# Patient Record
Sex: MALE | Race: WHITE | NOT HISPANIC OR LATINO | Employment: UNEMPLOYED | URBAN - METROPOLITAN AREA
[De-identification: names, ages, dates, MRNs, and addresses within clinical notes are randomized per-mention and may not be internally consistent; named-entity substitution may affect disease eponyms.]

---

## 2024-04-24 DIAGNOSIS — Z52.001 STEM CELL DONOR: Primary | ICD-10-CM

## 2024-04-30 ENCOUNTER — LAB (OUTPATIENT)
Dept: LAB | Facility: CLINIC | Age: 54
End: 2024-04-30

## 2024-04-30 DIAGNOSIS — Z52.001 STEM CELL DONOR: ICD-10-CM

## 2024-04-30 PROCEDURE — 81378 HLA I & II TYPING HR: CPT

## 2024-05-06 LAB
A*: NORMAL
A*LOCUS SEROLOGIC EQUIVALENT: 2
A*LOCUS: NORMAL
A*SEROLOGIC EQUIVALENT: 24
ABTEST METHOD: NORMAL
B*: NORMAL
B*LOCUS SEROLOGIC EQUIVALENT: 60
B*LOCUS: NORMAL
B*SEROLOGIC EQUIVALENT: 58
BW-1: NORMAL
BW-2: NORMAL
C*: NORMAL
C*LOCUS SEROLOGIC EQUIVALENT: 10
C*LOCUS: NORMAL
C*SEROLOGIC EQUIVALENT: 7
DPA1*: NORMAL
DPB1*: NORMAL
DPB1*LOCUS NMDP: NORMAL
DPB1*LOCUS: NORMAL
DPB1*NMDP: NORMAL
DQA1*: NORMAL
DQA1*LOCUS: NORMAL
DQB1*: NORMAL
DQB1*LOCUS SEROLOGIC EQUIVALENT: 8
DQB1*LOCUS: NORMAL
DQB1*SEROLOGIC EQUIVALENT: 6
DRB1*: NORMAL
DRB1*LOCUS SEROLOGIC EQUIVALENT: 4
DRB1*LOCUS: NORMAL
DRB1*SEROLOGIC EQUIVALENT: 13
DRB3*LOCUS SEROLOGIC EQUIVALENT: 52
DRB3*LOCUS: NORMAL
DRB4*: NORMAL
DRB4*SEROLOGIC EQUIVALENT: 53
DRSSO TEST METHOD: NORMAL

## 2024-05-07 ENCOUNTER — TELEPHONE (OUTPATIENT)
Dept: ONCOLOGY | Facility: CLINIC | Age: 54
End: 2024-05-07
Payer: COMMERCIAL

## 2024-05-07 NOTE — TELEPHONE ENCOUNTER
5/7/2024    I called Rodrigo today to discuss coordination of a blood drawn for genetic testing, to aid in his sister's genetics evaluation. As I was unable to reach him, I left a non-detailed voicemail with my name and phone number.    Destinee Dupree MS, [facility] [place]  Licensed, Certified Genetic Counselor  Office: [phone]  [email or web address]

## 2024-05-08 ENCOUNTER — TELEPHONE (OUTPATIENT)
Dept: ONCOLOGY | Facility: CLINIC | Age: 54
End: 2024-05-08
Payer: COMMERCIAL

## 2024-05-08 DIAGNOSIS — Z83.2 FAMILY HISTORY OF APLASTIC ANEMIA: Primary | ICD-10-CM

## 2024-05-08 NOTE — TELEPHONE ENCOUNTER
5/8/2024    I called Rodrigo today to discuss collection of a blood sample to aid in his sister's genetics evaluation. Rodrigo shared that his sister told him to anticipate a phone call from me.    We discussed that his sister Rosamaria (who gave verbal permission to share this information with Rodrigo) is currently undergoing genetic testing for inherited causes of her aplastic anemia. If a gene mutation is identified, it is often then recommended that any potential related bone marrow donors undergo testing for the same gene mutation to better understand if they have the same genetic condition. This can help Rosamaria's medical providers better understand who may be the best donor for Rosamaria's upcoming BMT.    To ensure no delays in Rosamaria's medical care, the genetic testing laboratory is requesting a blood sample from all potential related donors now. If Rosamaria is found to carry a gene mutation, these blood samples can then be tested ASAP. If Rosamaria is not found to carry a gene mutation, no genetic testing for relatives would then be performed. I would contact Rodrigo to discuss any potential genetic testing that may be recommended for him, though, before the testing is actually performed.     Rodrigo shared that he would be very willing to submit a sample now for possible genetic testing. As he does not live locally, we discussed sample collection options including blood versus buccal swab. Rodrigo explained that he would prefer a blood sample and will schedule a lab appointment at [facility].     Rodrigo thanked me for the phone call and denied having any other questions at this time. I also provided him my contact information, if he has any questions following our conversation.     Destinee Dupree MS, [facility] [place]  Licensed, Certified Genetic Counselor  Office: [phone]  [email or web address]

## 2024-05-13 ENCOUNTER — LAB (OUTPATIENT)
Dept: LAB | Facility: CLINIC | Age: 54
End: 2024-05-13
Attending: GENETIC COUNSELOR, MS
Payer: COMMERCIAL

## 2024-05-13 DIAGNOSIS — Z83.2 FAMILY HISTORY OF APLASTIC ANEMIA: ICD-10-CM

## 2024-05-13 PROCEDURE — 36415 COLL VENOUS BLD VENIPUNCTURE: CPT
